# Patient Record
Sex: FEMALE | Race: WHITE | Employment: UNEMPLOYED | URBAN - METROPOLITAN AREA
[De-identification: names, ages, dates, MRNs, and addresses within clinical notes are randomized per-mention and may not be internally consistent; named-entity substitution may affect disease eponyms.]

---

## 2023-12-24 ENCOUNTER — HOSPITAL ENCOUNTER (EMERGENCY)
Age: 38
Discharge: HOME | End: 2023-12-24
Attending: EMERGENCY MEDICINE
Payer: MEDICAID

## 2023-12-24 VITALS
DIASTOLIC BLOOD PRESSURE: 99 MMHG | OXYGEN SATURATION: 99 % | HEIGHT: 62 IN | BODY MASS INDEX: 29.44 KG/M2 | TEMPERATURE: 98 F | RESPIRATION RATE: 17 BRPM | SYSTOLIC BLOOD PRESSURE: 149 MMHG | HEART RATE: 82 BPM | WEIGHT: 160 LBS

## 2023-12-24 DIAGNOSIS — M54.6 BACK PAIN OF THORACOLUMBAR REGION: Primary | ICD-10-CM

## 2023-12-24 DIAGNOSIS — M54.50 BACK PAIN OF THORACOLUMBAR REGION: Primary | ICD-10-CM

## 2023-12-24 PROCEDURE — 96372 THER/PROPH/DIAG INJ SC/IM: CPT

## 2023-12-24 PROCEDURE — 99283 EMERGENCY DEPT VISIT LOW MDM: CPT

## 2023-12-24 RX ORDER — PREDNISONE 20 MG/1
40 TABLET ORAL ONCE
Status: COMPLETED | OUTPATIENT
Start: 2023-12-24 | End: 2023-12-24

## 2023-12-24 RX ORDER — TRAMADOL HYDROCHLORIDE 50 MG/1
100 TABLET ORAL ONCE
Status: COMPLETED | OUTPATIENT
Start: 2023-12-24 | End: 2023-12-24

## 2023-12-24 RX ORDER — KETOROLAC TROMETHAMINE 30 MG/ML
30 INJECTION, SOLUTION INTRAMUSCULAR; INTRAVENOUS ONCE
Status: COMPLETED | OUTPATIENT
Start: 2023-12-24 | End: 2023-12-24

## 2023-12-24 RX ORDER — CYCLOBENZAPRINE HCL 10 MG
10 TABLET ORAL 3 TIMES DAILY PRN
Qty: 20 TABLET | Refills: 0 | Status: SHIPPED | OUTPATIENT
Start: 2023-12-24 | End: 2023-12-31

## 2023-12-24 RX ORDER — CYCLOBENZAPRINE HCL 10 MG
10 TABLET ORAL ONCE
Status: COMPLETED | OUTPATIENT
Start: 2023-12-24 | End: 2023-12-24

## 2023-12-24 RX ORDER — METHYLPREDNISOLONE 4 MG/1
TABLET ORAL
Qty: 21 TABLET | Refills: 0 | Status: SHIPPED | OUTPATIENT
Start: 2023-12-24

## 2023-12-24 RX ORDER — IBUPROFEN 800 MG/1
800 TABLET ORAL EVERY 8 HOURS PRN
Qty: 30 TABLET | Refills: 0 | Status: SHIPPED | OUTPATIENT
Start: 2023-12-24 | End: 2023-12-31

## 2023-12-24 RX ORDER — TRAMADOL HYDROCHLORIDE 50 MG/1
TABLET ORAL EVERY 6 HOURS PRN
Qty: 10 TABLET | Refills: 0 | Status: SHIPPED | OUTPATIENT
Start: 2023-12-24 | End: 2023-12-29

## 2023-12-24 NOTE — DISCHARGE INSTRUCTIONS
Please follow-up with your primary care physician 1-2 days return to the ER if your symptoms worsen progress or if you have any further concerns. Please discuss physical therapy with your primary care physician. Please take the cyclobenzaprine as prescribed as he has muscle relaxer do not take and drive or operate heavy machinery as it will make you drowsy. Please take ibuprofen as prescribed as needed for back pain. For still having severe back pain please take tramadol as prescribed as needed for severe pain. Do not take tramadol and drive or operate heavy machinery as it will make you drowsy. Start the Medrol Dosepak tomorrow your first dose of steroids was given to you here in the ER today. Take these medications with food as will cause stomach upset if you do not.

## 2023-12-24 NOTE — ED INITIAL ASSESSMENT (HPI)
Patient complains of pain in neck, that radiates into her back  Also complains of sciatica pain on the right side.   Approximately  one month of symptoms

## 2024-11-29 ENCOUNTER — APPOINTMENT (OUTPATIENT)
Dept: CT IMAGING | Age: 39
End: 2024-11-29
Attending: PHYSICIAN ASSISTANT
Payer: MEDICAID

## 2024-11-29 ENCOUNTER — HOSPITAL ENCOUNTER (EMERGENCY)
Age: 39
Discharge: HOME | End: 2024-11-29
Payer: MEDICAID

## 2024-11-29 VITALS
RESPIRATION RATE: 19 BRPM | TEMPERATURE: 98 F | OXYGEN SATURATION: 99 % | SYSTOLIC BLOOD PRESSURE: 151 MMHG | WEIGHT: 160 LBS | HEART RATE: 78 BPM | HEIGHT: 62 IN | DIASTOLIC BLOOD PRESSURE: 104 MMHG | BODY MASS INDEX: 29.44 KG/M2

## 2024-11-29 DIAGNOSIS — S00.83XA CONTUSION OF FACE, INITIAL ENCOUNTER: Primary | ICD-10-CM

## 2024-11-29 DIAGNOSIS — S01.112A LACERATION OF LEFT EYEBROW, INITIAL ENCOUNTER: ICD-10-CM

## 2024-11-29 PROCEDURE — 99283 EMERGENCY DEPT VISIT LOW MDM: CPT

## 2024-11-29 PROCEDURE — 99284 EMERGENCY DEPT VISIT MOD MDM: CPT

## 2024-11-29 PROCEDURE — 70480 CT ORBIT/EAR/FOSSA W/O DYE: CPT | Performed by: PHYSICIAN ASSISTANT

## 2024-11-29 PROCEDURE — 12013 RPR F/E/E/N/L/M 2.6-5.0 CM: CPT

## 2024-11-29 PROCEDURE — 70450 CT HEAD/BRAIN W/O DYE: CPT | Performed by: PHYSICIAN ASSISTANT

## 2024-11-30 NOTE — ED INITIAL ASSESSMENT (HPI)
Fall while walking up to stairs - denies LOC. But possibly tumbled down the stairs . . +deep  laceration on left eyebrow .  Slight neck pain .

## 2024-11-30 NOTE — ED PROVIDER NOTES
Patient Seen in: [facility] In [place]      History     Chief Complaint   Patient presents with    Fall     Stated Complaint: Fall-Laceration to left eyebrow    Subjective:   HPI      39-year-old female.  Patient arrives to the ER for evaluation of a head injury and left brow laceration.  Occurred within the last 45 minutes.  Patient was attempting to climb the 2 steps from her deck back into her house and tripped and fell forward striking her head.  She is unsure what exactly she struck her head on.  Patient admitted consuming alcohol this evening.    Objective:     History reviewed. No pertinent past medical history.           History reviewed. No pertinent surgical history.             Social History     Socioeconomic History    Marital status: Single   Tobacco Use    Smoking status: Former     Types: Cigarettes    Smokeless tobacco: Former   Substance and Sexual Activity    Alcohol use: Yes     Alcohol/week: 1.0 standard drink of alcohol     Types: 1 Glasses of wine per week    Drug use: Never                  Physical Exam     ED Triage Vitals [11/29/24 2113]   BP (!) 151/104   Pulse 78   Resp 19   Temp 97.8 °F (36.6 °C)   Temp src Oral   SpO2 99 %   O2 Device        Current Vitals:   Vital Signs  BP: (!) 151/104  Pulse: 78  Resp: 19  Temp: 97.8 °F (36.6 °C)  Temp src: Oral    Oxygen Therapy  SpO2: 99 %        Physical Exam       Gen: Well appearing, well groomed, alert and aware x 3  Neck: Supple, full range of motion, no thyromegaly or lymphadenopathy.  No cervical point tenderness.  Eye examination: EOMs are intact, normal conjunctival  ENT: No Almanza sign or hemotympanum.  Large gaping laceration to the left lateral eyebrow.  Moderate black eye.  No obvious dental injury  Lung: No distress, RR, no retraction, breath sounds are clear bilaterally  Cardio: Regular rate and rhythm, normal S1-S2, no murmur appreciable  Extremities: Abrasion and bruising noted to the left knee  Back: Full  range of motion  Skin: No sign of trauma, Skin warm and dry, no induration or sign of infection.   Neuro: Cranial nerves intact (taste and smell omited), Normal Gait.  ED Course   Labs Reviewed - No data to display     CT ORBITS (CPT=70480)    Result Date: 11/29/2024  PROCEDURE:  CT ORBITS (IBM=18684)  COMPARISON:  [facility], CT, CT BRAIN OR HEAD (54181), 11/29/2024, 9:33 PM.  INDICATIONS:  Fall-Laceration to left eyebrow  TECHNIQUE:  Multi-planar CT images were performed through the orbits without intravenous contrast.  3D shaded surface renderings and MPR's are generated on an independent CT scanner workstation.  Dose reduction techniques were used. Dose information is transmitted to the ACR (American College of Radiology) NRDR (National Radiology Data Registry) which includes the Dose Index Registry.  3-D RENDERING:  Additional 3-D rendering was generated by the technologist.  PATIENT STATED HISTORY:(As transcribed by Technologist)  Fall-Laceration to left eyebrow    FINDINGS:  GLOBES:  No asymmetry or no visible mass.  EXTRAOCULAR MUSCLES:  No enlargement or asymmetry.  INTRACONAL SPACE:  No visible mass, with normal retrobulbar fat.  EXTRACONAL SPACE:  No visible mass.  SINUSES:  There is mild to moderate mucosal thickening in the right maxillary sinus. BONES:  Intact bony orbit, without evidence of fracture.  OTHER:  There is mild left periorbital swelling.             CONCLUSION:  1. Left preseptal periorbital swelling.  No underlying fracture. 2. Details as above.  Continued clinical correlation recommended.   LOCATION:  [facility]   Dictated by (CST): Boom Riojas MD on 11/29/2024 at 9:44 PM     Finalized by (CST): Boom Riojas MD on 11/29/2024 at 9:46 PM       CT BRAIN OR HEAD (CPT=70450)    Result Date: 11/29/2024  PROCEDURE:  CT BRAIN OR HEAD (12420)  COMPARISON:  None.  INDICATIONS:  Fall-Laceration to left eyebrow  TECHNIQUE:  Noncontrast CT scanning is performed through the brain. Dose reduction techniques  were used. Dose information is transmitted to the ACR (American College of Radiology) NRDR (National Radiology Data Registry) which includes the Dose Index Registry.  PATIENT STATED HISTORY: (As transcribed by Technologist)  Fall-Laceration to left eyebrow.    FINDINGS:  Ventricles and sulci are normal caliber.  Expected gray-white matter differentiation.  No mass effect or midline shift.  No acute intracranial hemorrhage.  Mild to moderate mucosal thickening in the right maxillary sinus.  The remainder appear  satisfactorily aerated as do the mastoid air cells.              CONCLUSION:  No acute intracranial abnormality.    LOCATION:  [facility]   Dictated by (CST): Boom Riojas MD on 11/29/2024 at 9:42 PM     Finalized by (CST): Boom Riojas MD on 11/29/2024 at 9:44 PM                 MDM        Large gaping laceration to the left brow.  Moderate black eye.  EOMs are intact.  Patient  intoxicated.  CT of the brain and orbit performed.  No obvious dental injury.  No hemotympanum or Almanza sign.      CONCLUSION:  1. Left preseptal periorbital swelling.  No underlying fracture. 2. Details as above.  Continued clinical correlation recommended.   LOCATION:  [facility]   Dictated by (CST): Boom Riojas MD on 11/29/2024 at 9:44 PM     Finalized by (CST): Boom Riojas MD on 11/29/2024 at 9:46 PM       CONCLUSION:  No acute intracranial abnormality.    LOCATION:  [facility]   Dictated by (CST): Boom Riojas MD on 11/29/2024 at 9:42 PM     Finalized by (CST): Boom Riojas MD on 11/29/2024 at 9:44 PM        Verbal consent for the following procedure was obtained.  We discussed the inherent risks of procedure.  We discussed benefits.  Patient agrees to proceed with the procedure and verbalizes understanding of potential complications.    3 cm gaping laceration to the left lateral brow.  Using lidocaine with epinephrine, local injections were performed.  Site was then thoroughly irrigated by myself.  Site sterilized.  Sterile drape.  Using 5-0  nylon, 8 simple interrupted sutures were placed.    Medical Decision Making      Disposition and Plan     Clinical Impression:  1. Contusion of face, initial encounter    2. Laceration of left eyebrow, initial encounter         Disposition:  Discharge  11/29/2024 10:25 pm    Follow-up:  No follow-up provider specified.        Medications Prescribed:  Current Discharge Medication List              Supplementary Documentation:

## 2024-12-06 ENCOUNTER — HOSPITAL ENCOUNTER (EMERGENCY)
Age: 39
Discharge: HOME | End: 2024-12-06
Payer: MEDICAID

## 2024-12-06 VITALS
SYSTOLIC BLOOD PRESSURE: 174 MMHG | HEART RATE: 72 BPM | TEMPERATURE: 98 F | WEIGHT: 160 LBS | HEIGHT: 62 IN | OXYGEN SATURATION: 100 % | RESPIRATION RATE: 18 BRPM | BODY MASS INDEX: 29.44 KG/M2 | DIASTOLIC BLOOD PRESSURE: 102 MMHG

## 2024-12-06 DIAGNOSIS — Z48.02 ENCOUNTER FOR STAPLE REMOVAL: ICD-10-CM

## 2024-12-06 DIAGNOSIS — S01.112D LACERATION OF LEFT EYEBROW, SUBSEQUENT ENCOUNTER: Primary | ICD-10-CM

## 2024-12-06 DIAGNOSIS — R03.0 ELEVATED BLOOD PRESSURE READING: ICD-10-CM

## 2024-12-06 PROCEDURE — 99281 EMR DPT VST MAYX REQ PHY/QHP: CPT

## 2024-12-06 PROCEDURE — 99282 EMERGENCY DEPT VISIT SF MDM: CPT

## 2024-12-06 RX ORDER — LOSARTAN POTASSIUM 25 MG/1
TABLET ORAL DAILY
COMMUNITY

## 2024-12-06 RX ORDER — ESCITALOPRAM OXALATE 5 MG/1
5 TABLET ORAL DAILY
COMMUNITY

## 2024-12-06 RX ORDER — TIZANIDINE 2 MG/1
2 TABLET ORAL EVERY 6 HOURS PRN
COMMUNITY

## 2024-12-06 RX ORDER — MELOXICAM 10 MG/1
CAPSULE ORAL
COMMUNITY

## 2024-12-06 RX ORDER — METOPROLOL SUCCINATE 50 MG/1
50 TABLET, EXTENDED RELEASE ORAL DAILY
COMMUNITY

## 2024-12-06 NOTE — DISCHARGE INSTRUCTIONS
Follow up with your primary doctor and neurology.  You need a recheck of your blood pressure and follow-up after your head injury.    If you have new, changing or worsening symptoms, please go directly to the ER.

## 2024-12-06 NOTE — ED PROVIDER NOTES
Patient Seen in: [facility] In [place]      History     Chief Complaint   Patient presents with    Sut Stap RingRemoval     Stated Complaint: suture removal    Subjective:   HPI      CHIEF COMPLAINT: Suture removal     HISTORY OF PRESENT ILLNESS: Patient is a 39-year-old female presenting for evaluation of suture removal.  Had 8 sutures placed to the left eyebrow 1 week ago.  States that it is healing well.  No bleeding or drainage.  She continues to have sensitivity around the left eye.  States that the bruising around the eye is improving.  She reports 2 or 3 sutures had fallen out at home.  She tried going back to work yesterday.  States that after about 6 hours she felt like she was sensitive to the sound and her work environment.  No headache.  No vision change.  No nausea or vomiting.  Has not been able to follow-up with her PCP yet.      REVIEW OF SYSTEMS:  Constitutional: no fever, no chills  Eyes: no discharge  ENT: no sore throat  Cardiovascular: no chest pain, no palpitations  Respiratory: no cough, no shortness of breath  Gastrointestinal: no abdominal pain, no vomiting  Genitourinary: no hematuria  Musculoskeletal: no back pain  Skin: no rashes  Neurological: no headache     Otherwise a complete review of systems was obtained and other than the HPI was negative     The patient's medication list, past medical history and social history elements is as listed in today's nurse's notes are reviewed and agree. The patient's family history is reviewed and is noncontributory to the presenting problem, except as indicated as above.    Objective:     Past Medical History:    Essential hypertension              History reviewed. No pertinent surgical history.             Social History     Socioeconomic History    Marital status: Single   Tobacco Use    Smoking status: Former     Types: Cigarettes    Smokeless tobacco: Former   Vaping Use    Vaping status: Some Days   Substance and Sexual  Activity    Alcohol use: Yes     Alcohol/week: 1.0 standard drink of alcohol     Types: 1 Glasses of wine per week    Drug use: Never                  Physical Exam     ED Triage Vitals [12/06/24 0943]   BP (!) 174/102   Pulse 72   Resp 18   Temp 97.5 °F (36.4 °C)   Temp src Oral   SpO2 100 %   O2 Device        Current Vitals:   Vital Signs  BP: (!) 174/102  Pulse: 72  Resp: 18  Temp: 97.5 °F (36.4 °C)  Temp src: Oral    Oxygen Therapy  SpO2: 100 %        Physical Exam  Vital signs and nursing notes reviewed  General Appearance: Patient is alert and oriented x4 in no acute distress  Eyes: pupils equal and round, reactive to light. No pallor or injection, subconjunctival hemorrhage.  No hyphema  NT: Handling secretions well  Respiratory: Normal effort  Neurological:  Grossly intact, no deficits.  Gait normal.  Skin:  warm and dry, no rashes.  No jaundice. Brisk capillary refill  Musculoskeletal: neck is supple  Extremities are symmetrical, full range of motion  Psychiatric: calm and cooperative  Eyebrow: There is a well-healed laceration with 5 sutures in place.  No surrounding warmth or erythema.  No bleeding or drainage.  No tenderness to palpation.  5 sutures removed without difficulty, patient tolerated well.  Orbits: Right orbit is nontender.  Left orbit nontender.  There is ecchymosis in varying states of resolved around the lower portion of the left orbit.    ED Course   Labs Reviewed - No data to display                MDM      This is a well-appearing 39-year-old female presenting for suture removal.  He has neurologically intact here today.  The previous ER no reports 8 sutures in place.  I removed 5 here today.  Patient reported a few sutures had fallen out at home.  She also reported some sound sensitivity.  Has not yet followed with the PCP.  Recommend she follows with PCP for reevaluation and recheck of the blood pressure.  I also gave her neurology that she could follow with.  If there are new,  changing or worsening symptoms return for reevaluation.  She voiced understanding to the treatment plan.  All questions answered. Patient left prior to having a repeat blood pressure taken.        Medical Decision Making      Disposition and Plan     Clinical Impression:  1. Laceration of left eyebrow, subsequent encounter    2. Encounter for staple removal    3. Elevated blood pressure reading         Disposition:  Discharge  12/6/2024  9:54 am    Follow-up:  Your primary care provider    Follow up  for a recheck of your blood pressure    Sabrina Conner PA  [address] [place] [id number] [phone]    Call today  concussion follow up          Medications Prescribed:  Discharge Medication List as of 12/6/2024  9:54 AM              Supplementary Documentation:

## (undated) NOTE — LETTER
Date & Time: 12/24/2023, 4:46 AM  Patient: Marisela Casey  Encounter Provider(s):    Maurilio Martins DO       To Whom It May Concern:    Marisela Casey was seen and treated in our department on 12/24/2023. She should not return to work until 12/29/2023 .     If you have any questions or concerns, please do not hesitate to call.        _____________________________  Physician/APC Signature

## (undated) NOTE — LETTER
Date & Time: 12/6/2024, 9:57 AM  Patient: Nurys Herron  Encounter Provider(s):    See, RIC Higgins       To Whom It May Concern:    Nurys Herron was seen and treated in our department on 12/6/2024. She should not return to work until 12/9/2024 .    If you have any questions or concerns, please do not hesitate to call.        _____________________________  Physician/APC Signature